# Patient Record
Sex: MALE | Race: WHITE | NOT HISPANIC OR LATINO | Employment: FULL TIME | URBAN - METROPOLITAN AREA
[De-identification: names, ages, dates, MRNs, and addresses within clinical notes are randomized per-mention and may not be internally consistent; named-entity substitution may affect disease eponyms.]

---

## 2017-06-21 ENCOUNTER — TELEPHONE (OUTPATIENT)
Dept: FAMILY MEDICINE | Facility: CLINIC | Age: 37
End: 2017-06-21

## 2017-06-21 NOTE — TELEPHONE ENCOUNTER
Reason for Call:  Other call back    Detailed comments: Pt has a question or concern he would like to ask Kimi Bonds and was later transferred to a Triage Nurse.    Phone Number Patient can be reached at: Home number on file [phone] (home)    Best Time: Anytime    Can we leave a detailed message on this number? YES    Call taken on 6/21/2017 at 1:38 PM by Santhosh Drake

## 2018-07-24 ENCOUNTER — OFFICE VISIT (OUTPATIENT)
Dept: FAMILY MEDICINE | Facility: CLINIC | Age: 38
End: 2018-07-24
Payer: OTHER MISCELLANEOUS

## 2018-07-24 VITALS
WEIGHT: 182 LBS | SYSTOLIC BLOOD PRESSURE: 139 MMHG | DIASTOLIC BLOOD PRESSURE: 83 MMHG | BODY MASS INDEX: 26.05 KG/M2 | HEART RATE: 61 BPM | TEMPERATURE: 98.6 F | OXYGEN SATURATION: 98 % | HEIGHT: 70 IN

## 2018-07-24 DIAGNOSIS — S61.411A LACERATION OF RIGHT HAND WITHOUT FOREIGN BODY, INITIAL ENCOUNTER: Primary | ICD-10-CM

## 2018-07-24 PROCEDURE — 12001 RPR S/N/AX/GEN/TRNK 2.5CM/<: CPT | Performed by: NURSE PRACTITIONER

## 2018-07-24 PROCEDURE — 99207 ZZC NO CHARGE LOS: CPT | Performed by: NURSE PRACTITIONER

## 2018-07-24 ASSESSMENT — PAIN SCALES - GENERAL: PAINLEVEL: MILD PAIN (2)

## 2018-07-24 NOTE — LETTER
REPORT OF WORK COMP    [address] [address]  [phone]      PATIENT DATA    Employee Name: Laron Santiago      : 1980     #: [id number]    Work related injury: Yes  Employer at time of injury:   Employer contact & phone:   Employed elsewhere? No  Workers' Compensation Carrier/Managed Care Plan:      Today's date: 2018  Date of injury: 2018  Date of first visit: 2018    PROVIDER EVALUATION: Please fill in as needed.  Please give copy to employee for employer.    1. Diagnosis: right hand laceration    2. Treatment: laceration repair  3. Medication: none  NOTE: When ordering a medication, [place] Rules require Work Comp or WC on prescriptions.    4. No work from n/a to n/a.  5. Return to work date: 2018   ** WITH RESTRICTIONS? Yes, with work restrictions: * Other: left hand work with right hand assist  DURATION OF LIMITATIONS: until sutures out in 9-10 days      RESTRICTIONS: Unlimited unless listed.  Restrictions apply to home and leisure also.  If work restrictions is not available, the employee is totally disabled.    Maximum Medical Improvement (Date): 2018  Any Permanent Partial Disability? 0%    Provider comments:     Medical Examiner: ROXI Paz CNP            License or registration: [id number]    Next appointment: 9-10 days for suture removal    CC: Employer, Managed Care Plan/Payor, Patient

## 2018-07-24 NOTE — PATIENT INSTRUCTIONS
Cleanse daily (shower/wash hands like normal)  Keep clean and dry  Change dressing daily (Bacitracin + Band Aid) x2-3 days and then leave open to air or cover only with a band aid while working  Monitor for signs of infection including redness, warmth, drainage, increased pain. If these develop, please get re-evaluated  Return in 9-10 days for suture removal (nurse visit)          At [facility], we strive to deliver an exceptional experience to you, every time we see you.  If you receive a survey in the mail, please send us back your thoughts. We really do value your feedback.    Based on your medical history, these are the current health maintenance/preventive care services that you are due for (some may have been done at this visit.)  Health Maintenance Due   Topic Date Due     PHQ-2 Q1 YR  02/24/1990     HIV SCREEN (SYSTEM ASSIGNED)  02/24/1996     LIPID SCREEN Q5 YR MALE (SYSTEM ASSIGNED)  02/24/2013         Suggested websites for health information:  Www.[place].org : Up to date and easily searchable information on multiple topics.  Www.medlineplus.gov : medication info, interactive tutorials, watch real surgeries online  Www.familydoctor.org : good info from the Academy of Family Physicians  Www.cdc.gov : public health info, travel advisories, epidemics (H1N1)  Www.aap.org : children's health info, normal development, vaccinations  Www.health.state.[place].us : [place] dept of health, public health issues in [place], N1N1    Your care team:                            Family Medicine Internal Medicine   MD Paulie Nathan MD Shantel Branch-Fleming, MD Katya Georgiev PA-C Nam Ho, MD Pediatrics   HANNAH Calles, MD Najma Leigh CNP, MD Deborah Mielke, MD Kim Thein, APRN CNP      Clinic hours: Monday - Thursday 7 am-7 pm; Fridays 7 am-5 pm.   Urgent care: Monday - Friday 11 am-9 pm; Saturday and Sunday 9 am-5  pm.  Pharmacy : Monday -Thursday 8 am-8 pm; Friday 8 am-6 pm; Saturday and Sunday 9 am-5 pm.     Clinic: [phone]   Pharmacy: [phone]    Hand Laceration: All Closures  A laceration is a cut through the skin. Deep cuts usually require stitches. Minor cuts may be closed with surgical tape or skin adhesive.   X-rays may be done if something may have entered the skin through the cut, such as broken glass. You may also be given a tetanus shot if you are not up to date on this vaccination and the object that cut you may carry tetanus.    Home care    Your healthcare provider may prescribe an antibiotic. This is to help prevent infection. Follow all instructions for taking this medicine. Take the medicine every day until it is gone or you are told to stop. You should not have any left over.    The healthcare provider may prescribe medicines for pain. Follow instructions for taking them.    Follow the healthcare provider s instructions on how to care for the cut.    Keep the wound clean and dry. Don't get the wound wet until you are told it is OK to do so. If the bandage gets wet, remove it. Gently pat the wound dry with a clean cloth. Then put on a clean, dry bandage.    To help prevent infection, wash your hands with soap and water before and after caring for the wound.     Caring for stiches: Once you no longer need to keep the stitches dry, clean the wound daily. First, remove the bandage. Then wash the area gently with soap and warm water, or as directed by the healthcare provider. Use a wet cotton swab to loosen and remove any blood or crust that forms. After cleaning, apply a thin layer of antibiotic ointment if advised. Then put on a new bandage unless you are told not to.    Caring for skin glue: Don t put apply liquid, ointment, or cream on the wound while the glue is in place. Avoid activities that cause heavy sweating. Protect the wound from sunlight. Don't scratch, rub, or pick at the adhesive  film. Don't place tape directly over the film. The glue should peel off within 5 to 10 days.     Caring for surgical tape: Keep the area dry. If it gets wet, blot it dry with a clean towel. Surgical tape usually falls off within 7 to 10 days. If it has not fallen off after 10 days, you can take it off yourself. Put mineral oil or petroleum jelly on a cotton ball and gently rub the tape until it is removed.    Once you can get the wound wet, you may shower as usual, but don't soak the wound in water. This means no tub baths or swimming.    Even with proper treatment, a wound infection may sometimes occur. Check the wound daily for signs of infection listed below.  Follow-up care  Follow up with your healthcare provider, or as advised. If you have stitches, be sure to return as directed to have them removed.  When to seek medical advice  Call your healthcare provider right away if any of these occur:    Wound bleeding not controlled by direct pressure    Signs of infection, including increasing pain in the wound, increasing wound redness or swelling, or pus or bad odor coming from the wound    Fever of 100.4 F (38. C) o higher, or as directed by your healthcare provider    Stitches come apart or fall out or surgical tape falls off before 7 days    Wound edges reopen    Wound changes colors    Numbness or weakness in the affected hand     Decreased movement of the hand  Date Last Reviewed: 7/1/2017 2000-2017 [employer]. [address] [place] [address], [place] [address]. All rights reserved. This information is not intended as a substitute for professional medical care. Always follow your healthcare professional's instructions.

## 2018-07-24 NOTE — PROGRESS NOTES
"  SUBJECTIVE:   Laron Santiago is a 38 year old male who presents to clinic today for the following health issues:      Concern - laceration  Onset: this morning    Description:   Cut on right thumb at work    Intensity: moderate    Progression of Symptoms:  same    Accompanying Signs & Symptoms:  None.    Previous history of similar problem:   None.    Precipitating factors:   Worsened by: wound stretch open when bending thumb.    Alleviating factors:  Improved by: none.    Therapies Tried and outcome: none.    38 year old male presents with the above concerns. Laceration to right thenar eminance about 1.5 hours ago while at work. He caught it on a corner. Didn't bleed a lot. Last tetanus 9/28/2013 and is UTD. No numbness, tingling, weakness, loss of function.This is a workmans comp injury.    Problem list and histories reviewed & adjusted, as indicated.  Additional history: as documented      No current outpatient prescriptions on file.     No Known Allergies    Reviewed and updated as needed this visit by clinical staff  Tobacco  Allergies  Meds  Problems  Med Hx  Surg Hx  Fam Hx  Soc Hx        Reviewed and updated as needed this visit by Provider  Allergies  Meds  Problems         ROS:  Constitutional, HEENT, cardiovascular, pulmonary, gi and gu systems are negative, except as otherwise noted.    OBJECTIVE:     /83 (BP Location: Left arm, Patient Position: Chair, Cuff Size: Adult Large)  Pulse 61  Temp 98.6  F (37  C) (Oral)  Ht 5' 10\" (1.778 m)  Wt 182 lb (82.6 kg)  SpO2 98%  BMI 26.11 kg/m2  Body mass index is 26.11 kg/(m^2).  GENERAL: healthy, alert and no distress  MS: FROM of all fingers/thumb of right hand. Normal function with both flexion and extension against resistance. CMS intact distally. Cap refill <2 sec  SKIN: 1.5 cm laceration to right thenar eminence  Characteristics of the laceration: bleeding- mild, clean and straight  Tendon function intact: yes  Sensation to light " touch intact: yes  Pulses intact: yes  PSYCH: mentation appears normal, affect normal/bright    Diagnostic Test Results:  none       Wound was locally injected with 1 cc's of Lidocaine 1% plain  Good anesthesia was obtained  Prepped and draped in the usual sterile fashion  Wound cleaned with sterile water  Wound soaked  Wound irrigated  Laceration was closed using 2 5-0 nylon interrupted sutures    ASSESSMENT/PLAN:       1. Laceration of right hand without foreign body, initial encounter  Cleanse daily (shower/wash hands like normal)  Keep clean and dry  Change dressing daily (Bacitracin + Band Aid) x2-3 days and then leave open to air or cover only with a band aid while working  Monitor for signs of infection including redness, warmth, drainage, increased pain. If these develop, please get re-evaluated  Return in 9-10 days for suture removal    See Patient Instructions    The benefits, risks and potential side effects were discussed in detail. Black box warnings discussed as relevant. All patient questions were answered. The patient was instructed to follow up immediately if any adverse reactions develop.    Patient verbalizes understanding and agrees with plan of care. Patient stable for discharge.      ROXI Paz [facility]

## 2022-10-15 NOTE — MR AVS SNAPSHOT
After Visit Summary   7/24/2018    Laron Santiago    MRN: [id number]           Patient Information     Date Of Birth          1980        Visit Information        Provider Department      7/24/2018 10:00 AM Ciara Thomson APRN CNP [facility]        Today's Diagnoses     Laceration of right hand without foreign body, initial encounter    -  1      Care Instructions    Cleanse daily (shower/wash hands like normal)  Keep clean and dry  Change dressing daily (Bacitracin + Band Aid) x2-3 days and then leave open to air or cover only with a band aid while working  Monitor for signs of infection including redness, warmth, drainage, increased pain. If these develop, please get re-evaluated  Return in 9-10 days for suture removal (nurse visit)          At [facility], we strive to deliver an exceptional experience to you, every time we see you.  If you receive a survey in the mail, please send us back your thoughts. We really do value your feedback.    Based on your medical history, these are the current health maintenance/preventive care services that you are due for (some may have been done at this visit.)  Health Maintenance Due   Topic Date Due     PHQ-2 Q1 YR  02/24/1990     HIV SCREEN (SYSTEM ASSIGNED)  02/24/1996     LIPID SCREEN Q5 YR MALE (SYSTEM ASSIGNED)  02/24/2013         Suggested websites for health information:  Www.[employer].org : Up to date and easily searchable information on multiple topics.  Www.medlineplus.gov : medication info, interactive tutorials, watch real surgeries online  Www.familydoctor.org : good info from the Academy of Family Physicians  Www.cdc.gov : public health info, travel advisories, epidemics (H1N1)  Www.aap.org : children's health info, normal development, vaccinations  Www.health.state.[place].us : [place] dept of health, public health issues in [place], N1N1    Your care team:                            Family Medicine Internal  Birth Registry interview complete via phone call. Birth Certification Confirmation sheet to be signed by RN and paperwork to be collected by RN any time prior to discharge.   Medicine   MD Paulie Nathan MD Shantel Branch-Fleming, MD Katya Georgiev PA-C Nam Ho, MD Pediatrics   HANNAH Calles, VANDANA Parra APRN CNP   MD Najma Harris MD Deborah Mielke, MD Kim Thein, [facility]      Clinic hours: Monday - Thursday 7 am-7 pm; Fridays 7 am-5 pm.   Urgent care: Monday - Friday 11 am-9 pm; Saturday and Sunday 9 am-5 pm.  Pharmacy : Monday -Thursday 8 am-8 pm; Friday 8 am-6 pm; Saturday and Sunday 9 am-5 pm.     Clinic: [phone]   Pharmacy: [phone]    Hand Laceration: All Closures  A laceration is a cut through the skin. Deep cuts usually require stitches. Minor cuts may be closed with surgical tape or skin adhesive.   X-rays may be done if something may have entered the skin through the cut, such as broken glass. You may also be given a tetanus shot if you are not up to date on this vaccination and the object that cut you may carry tetanus.    Home care    Your healthcare provider may prescribe an antibiotic. This is to help prevent infection. Follow all instructions for taking this medicine. Take the medicine every day until it is gone or you are told to stop. You should not have any left over.    The healthcare provider may prescribe medicines for pain. Follow instructions for taking them.    Follow the healthcare provider s instructions on how to care for the cut.    Keep the wound clean and dry. Don't get the wound wet until you are told it is OK to do so. If the bandage gets wet, remove it. Gently pat the wound dry with a clean cloth. Then put on a clean, dry bandage.    To help prevent infection, wash your hands with soap and water before and after caring for the wound.     Caring for stiches: Once you no longer need to keep the stitches dry, clean the wound daily. First, remove the bandage. Then wash the area gently with soap and warm water, or as directed by the healthcare provider. Use a wet cotton  swab to loosen and remove any blood or crust that forms. After cleaning, apply a thin layer of antibiotic ointment if advised. Then put on a new bandage unless you are told not to.    Caring for skin glue: Don t put apply liquid, ointment, or cream on the wound while the glue is in place. Avoid activities that cause heavy sweating. Protect the wound from sunlight. Don't scratch, rub, or pick at the adhesive film. Don't place tape directly over the film. The glue should peel off within 5 to 10 days.     Caring for surgical tape: Keep the area dry. If it gets wet, blot it dry with a clean towel. Surgical tape usually falls off within 7 to 10 days. If it has not fallen off after 10 days, you can take it off yourself. Put mineral oil or petroleum jelly on a cotton ball and gently rub the tape until it is removed.    Once you can get the wound wet, you may shower as usual, but don't soak the wound in water. This means no tub baths or swimming.    Even with proper treatment, a wound infection may sometimes occur. Check the wound daily for signs of infection listed below.  Follow-up care  Follow up with your healthcare provider, or as advised. If you have stitches, be sure to return as directed to have them removed.  When to seek medical advice  Call your healthcare provider right away if any of these occur:    Wound bleeding not controlled by direct pressure    Signs of infection, including increasing pain in the wound, increasing wound redness or swelling, or pus or bad odor coming from the wound    Fever of 100.4 F (38. C) o higher, or as directed by your healthcare provider    Stitches come apart or fall out or surgical tape falls off before 7 days    Wound edges reopen    Wound changes colors    Numbness or weakness in the affected hand     Decreased movement of the hand  Date Last Reviewed: 7/1/2017 2000-2017 [employer]. [address] [facility], [place] [address]. All rights reserved. This  "information is not intended as a substitute for professional medical care. Always follow your healthcare professional's instructions.                Follow-ups after your visit        Who to contact     If you have questions or need follow up information about today's clinic visit or your schedule please contact [facility] directly at [phone].  Normal or non-critical lab and imaging results will be communicated to you by MyChart, letter or phone within 4 business days after the clinic has received the results. If you do not hear from us within 7 days, please contact the clinic through [employer] or phone. If you have a critical or abnormal lab result, we will notify you by phone as soon as possible.  Submit refill requests through [employer] or call your pharmacy and they will forward the refill request to us. Please allow 3 business days for your refill to be completed.          Additional Information About Your Visit        MyChart Information     [employer] lets you send messages to your doctor, view your test results, renew your prescriptions, schedule appointments and more. To sign up, go to [email or web address] . Click on \"Log in\" on the left side of the screen, which will take you to the Welcome page. Then click on \"Sign up Now\" on the right side of the page.     You will be asked to enter the access code listed below, as well as some personal information. Please follow the directions to create your username and password.     Your access code is: [email or web address]  Expires: 10/22/2018 11:15 AM     Your access code will  in 90 days. If you need help or a new code, please call your [facility] or [phone].        Care EveryWhere ID     This is your Care EveryWhere ID. This could be used by other organizations to access your [place] medical records  [id number]        Your Vitals Were     Pulse Temperature Height Pulse Oximetry BMI (Body Mass Index)       61 98.6  F (37  C) (Oral) " "5' 10\" (1.778 m) 98% 26.11 kg/m2        Blood Pressure from Last 3 Encounters:   07/24/18 139/83   08/13/15 137/86   05/04/15 137/89    Weight from Last 3 Encounters:   07/24/18 182 lb (82.6 kg)   08/13/15 184 lb (83.5 kg)   05/04/15 185 lb (83.9 kg)              Today, you had the following     No orders found for display       Primary Care Provider    None Specified       No primary provider on file.        Equal Access to Services     JOSHUA LAURENT : Hadii chance salaso Sochristian, waaxda luqadaha, qaybta kaalmada aderudolphyabelem, tabitha maaral . So [facility] [phone].    ATENCIÓN: Si habla español, tiene a perez disposición servicios gratuitos de asistencia lingüística. Llame al [phone].    We comply with applicable federal civil rights laws and [place] laws. We do not discriminate on the basis of race, color, national origin, age, disability, sex, sexual orientation, or gender identity.            Thank you!     Thank you for choosing [facility]  for your care. Our goal is always to provide you with excellent care. Hearing back from our patients is one way we can continue to improve our services. Please take a few minutes to complete the written survey that you may receive in the mail after your visit with us. Thank you!             Your Updated Medication List - Protect others around you: Learn how to safely use, store and throw away your medicines at www.disposemymeds.org.      Notice  As of 7/24/2018 11:15 AM    You have not been prescribed any medications.      "

## 2023-06-19 ENCOUNTER — OFFICE VISIT (OUTPATIENT)
Dept: URGENT CARE | Facility: URGENT CARE | Age: 43
End: 2023-06-19
Payer: COMMERCIAL

## 2023-06-19 VITALS
DIASTOLIC BLOOD PRESSURE: 88 MMHG | WEIGHT: 200 LBS | HEART RATE: 89 BPM | BODY MASS INDEX: 28.7 KG/M2 | TEMPERATURE: 98 F | OXYGEN SATURATION: 97 % | SYSTOLIC BLOOD PRESSURE: 141 MMHG

## 2023-06-19 DIAGNOSIS — H01.004 BLEPHARITIS OF UPPER EYELIDS OF BOTH EYES, UNSPECIFIED TYPE: ICD-10-CM

## 2023-06-19 DIAGNOSIS — H01.001 BLEPHARITIS OF UPPER EYELIDS OF BOTH EYES, UNSPECIFIED TYPE: ICD-10-CM

## 2023-06-19 DIAGNOSIS — L23.7 CONTACT DERMATITIS DUE TO POISON SUMAC: Primary | ICD-10-CM

## 2023-06-19 PROCEDURE — 99203 OFFICE O/P NEW LOW 30 MIN: CPT | Performed by: EMERGENCY MEDICINE

## 2023-06-19 RX ORDER — OLOPATADINE HYDROCHLORIDE 1 MG/ML
1 SOLUTION/ DROPS OPHTHALMIC 2 TIMES DAILY
Qty: 3 ML | Refills: 0 | Status: SHIPPED | OUTPATIENT
Start: 2023-06-19 | End: 2023-06-26

## 2023-06-19 RX ORDER — PREDNISONE 20 MG/1
TABLET ORAL
Qty: 20 TABLET | Refills: 0 | Status: SHIPPED | OUTPATIENT
Start: 2023-06-19

## 2023-06-19 NOTE — PROGRESS NOTES
Assessment & Plan     Diagnosis:  (L23.7) Contact dermatitis due to poison sumac  (primary encounter diagnosis)  Plan: predniSONE (DELTASONE) 20 MG tablet    (H01.001,  H01.004) Blepharitis of upper eyelids of both eyes, unspecified type  Plan: predniSONE (DELTASONE) 20 MG tablet,         olopatadine (PATANOL) 0.1 % ophthalmic solution      Medical Decision Making:  Laron Santiago is a 43 year old male who presents for evaluation of a rash.  This appears consistent with a contact dermatitis; likely poison sumac given patient report of having this on his property and past hx of similar reactions. Patient states that very well with prednisone in the past, this is in the periorbital region on his penis, his eyes themselves appear to be unaffected but he does have signs of blepharitis and slight rash below the eyes.  He did not allow  exam today but states it is minimal on his penis.  Outpatient regimen as noted above.  See primary this week for recheck.       Ej Ahuja PA-C  [facility] U[facility]    Subjective     Laron Santiago is a 43 year old male who presents to clinic today for the following health issues:  Chief Complaint   Patient presents with     Allergic Reaction     Sumac        HPI  Patient is a has poison sumac on his property and yesterday started getting a reaction underneath his eyes, as well as on his penis.  He states that his upper eyelids do seem to be a little bit swollen, there is no redness in his eyes or vision changes.  He notes that the rash is minimal on the penis, does not want an exam for this.  States that it is itchy, red, becoming slightly painful.  He has had prednisone in the past with good response to poison sumac.  He denies any fevers, sore throat, shortness of breath, difficulty swallowing or breathing or other concerns.    Review of Systems    See HPI    Objective      Vitals: BP (!) 141/88   Pulse 89   Temp 98  F (36.7  C) (Tympanic)   Wt 90.7 kg (200  lb)   SpO2 97%   BMI 28.70 kg/m    Resp: 16    Patient Vitals for the past 24 hrs:   BP Temp Temp src Pulse SpO2 Weight   06/19/23 1021 (!) 141/88 98  F (36.7  C) Tympanic 89 97 % 90.7 kg (200 lb)       Vital signs reviewed by: Ej Ahuja PA-C    Physical Exam   Constitutional: Patient is alert and cooperative. No acute distress.  Mouth: Mucous membranes are moist. Normal tongue and tonsil. Posterior oropharynx is clear.  Eyes: Bilateral upper eyelids slightly swollen; non-erythematous. No masses, discharge or fluctuance.  Conjunctivae, EOMI are normal. PERRL.  Cardiovascular: Regular rate and rhythm  Pulmonary/Chest: Lungs are clear to auscultation throughout. Effort normal. No respiratory distress. No wheezes, rales or rhonchi.  : Declined  Neurological: Alert and oriented x3.   Skin: There is a rash below the bilateral eyes which is slightly erythematous, macular, blanches normally. No vesicles, blisters, petechia or purpura.  Psychiatric:The patient has a normal mood and affect.       Ej Ahuja PA-C, June 19, 2023

## 2023-07-16 ENCOUNTER — HEALTH MAINTENANCE LETTER (OUTPATIENT)
Age: 43
End: 2023-07-16

## 2024-09-07 ENCOUNTER — HEALTH MAINTENANCE LETTER (OUTPATIENT)
Age: 44
End: 2024-09-07